# Patient Record
Sex: FEMALE | Race: BLACK OR AFRICAN AMERICAN | Employment: FULL TIME | URBAN - METROPOLITAN AREA
[De-identification: names, ages, dates, MRNs, and addresses within clinical notes are randomized per-mention and may not be internally consistent; named-entity substitution may affect disease eponyms.]

---

## 2017-04-11 ENCOUNTER — OFFICE VISIT (OUTPATIENT)
Dept: CARDIOLOGY CLINIC | Age: 57
End: 2017-04-11

## 2017-04-11 VITALS
WEIGHT: 174 LBS | DIASTOLIC BLOOD PRESSURE: 80 MMHG | BODY MASS INDEX: 30.83 KG/M2 | OXYGEN SATURATION: 98 % | SYSTOLIC BLOOD PRESSURE: 158 MMHG | HEART RATE: 120 BPM | HEIGHT: 63 IN

## 2017-04-11 DIAGNOSIS — R00.0 SINUS TACHYCARDIA: Primary | ICD-10-CM

## 2017-04-11 DIAGNOSIS — R00.2 PALPITATIONS: ICD-10-CM

## 2017-04-11 DIAGNOSIS — I10 ESSENTIAL HYPERTENSION: ICD-10-CM

## 2017-04-11 DIAGNOSIS — I45.10 RBBB: ICD-10-CM

## 2017-04-11 NOTE — PROGRESS NOTES
HISTORY OF PRESENT ILLNESS  Yanick Licona is a 60 y.o. female. HPI  She feels good. She denies palpitations at the present time, but she has had some palpitations as flip-flops. She denies prolonged palpitations. She denies chest pain, dyspnea, orthopnea or PND. She has had no dizziness or syncope. She has had no symptoms to indicate TIA or amaurosis fugax. Her blood pressure was initially 170/90, which came down to 158/80, and her pulse rate was up to 120 per minute, which came down to 105 per minute a few minutes later. She is a nonsmoker. She has history of hypertension but no diabetes mellitus. Her cholesterol profile is unknown, but she has been told that it has been okay. She denies a family history of coronary artery disease other than her maternal grandfather having a heart attack in his [de-identified]. Review of Systems   Constitutional: Negative for malaise/fatigue and weight loss. HENT: Negative for hearing loss. Eyes: Negative for blurred vision and double vision. Respiratory: Negative for shortness of breath. Cardiovascular: Positive for palpitations. Negative for chest pain, orthopnea, claudication, leg swelling and PND. Gastrointestinal: Negative for blood in stool, heartburn and melena. Genitourinary: Negative for dysuria, frequency, hematuria and urgency. Musculoskeletal: Negative for back pain and joint pain. Skin: Negative for itching and rash. Neurological: Negative for dizziness, loss of consciousness, weakness and headaches. Psychiatric/Behavioral: Negative for depression and memory loss. Physical Exam   Constitutional: She is oriented to person, place, and time. She appears well-developed and well-nourished. HENT:   Head: Normocephalic and atraumatic. Eyes: Conjunctivae are normal. Pupils are equal, round, and reactive to light. Neck: Normal range of motion. Neck supple. No JVD present.    Cardiovascular: Regular rhythm, S1 normal and S2 normal.   No extrasystoles are present. Tachycardia present. PMI is not displaced. Exam reveals no gallop and no friction rub. No murmur heard. Pulses:       Carotid pulses are 3+ on the right side, and 3+ on the left side. Pulmonary/Chest: Effort normal. She has no rales. Abdominal: Soft. There is no tenderness. Musculoskeletal: She exhibits no edema. Neurological: She is alert and oriented to person, place, and time. No cranial nerve deficit. Skin: Skin is warm and dry. Psychiatric: She has a normal mood and affect. Her behavior is normal.     Visit Vitals    /80    Pulse (!) 120    Ht 5' 2.5\" (1.588 m)    Wt 78.9 kg (174 lb)    SpO2 98%    BMI 31.32 kg/m2       Past Medical History:   Diagnosis Date    HTN (hypertension)        Social History     Social History    Marital status:      Spouse name: N/A    Number of children: N/A    Years of education: N/A     Occupational History    Not on file. Social History Main Topics    Smoking status: Never Smoker    Smokeless tobacco: Never Used    Alcohol use No    Drug use: Not on file    Sexual activity: Not on file     Other Topics Concern    Not on file     Social History Narrative       Family History   Problem Relation Age of Onset    Hypertension Mother        Past Surgical History:   Procedure Laterality Date    HX TONSILLECTOMY      HX TUBAL LIGATION         Current Outpatient Prescriptions   Medication Sig Dispense Refill    valsartan-hydrochlorothiazide (DIOVAN-HCT) 80-12.5 mg per tablet Take 1 Tab by mouth daily. 0       EKG: sinus tachycardia, RBBB, HR faster than 5/17/16  . ASSESSMENT and PLAN  Encounter Diagnoses   Name Primary?     Sinus tachycardia Yes    Palpitations     Essential hypertension     RBBB    This patient had sinus tachycardia at a rate of 120 when she first came in, which came down to 105  still fast.  This may well be related to anxiety since her blood pressure was elevated at first as well. Her tachycardic response appears to be somewhat excessive. At this point, I would proceed with the Holter monitor to see if she has any inappropriate sinus tachycardia. She may benefit from betablocker treatment for the purpose of treating her hypertension and tachycardia in the future.

## 2017-04-11 NOTE — MR AVS SNAPSHOT
Visit Information Date & Time Provider Department Dept. Phone Encounter #  
 4/11/2017  3:40 PM Bill Fatima MD [facility] [id number] Upcoming Health Maintenance Date Due Hepatitis C Screening 1960 DTaP/Tdap/Td series (1 - Tdap) 6/26/1979 PAP AKA CERVICAL CYTOLOGY 6/26/1979 BREAST CANCER SCRN MAMMOGRAM 6/26/2008 FOBT Q 1 YEAR AGE 50-75 6/26/2008 INFLUENZA AGE 9 TO ADULT 8/1/2016 Allergies as of 4/11/2017  Review Complete On: 4/11/2017 By: Bill Fatima MD  
 Not on File Current Immunizations  Never Reviewed No immunizations on file. Not reviewed this visit You Were Diagnosed With   
  
 Codes Comments Palpitations    -  Primary ICD-10-CM: R00.2 ICD-9-CM: 785.1 Essential hypertension     ICD-10-CM: I10 
ICD-9-CM: 401.9 RBBB     ICD-10-CM: I45.10 ICD-9-CM: 426.4 Vitals BP Pulse Height(growth percentile) Weight(growth percentile) SpO2 BMI  
 158/80 (!) 120 5' 2.5\" (1.588 m) 174 lb (78.9 kg) 98% 31.32 kg/m2 Smoking Status Never Smoker Vitals History BMI and BSA Data Body Mass Index Body Surface Area  
 31.32 kg/m 2 1.87 m 2 Your Updated Medication List  
  
   
This list is accurate as of: 4/11/17  4:47 PM.  Always use your most recent med list.  
  
  
  
  
 valsartan-hydroCHLOROthiazide 80-12.5 mg per tablet Commonly known as:  DIOVAN-HCT Take 1 Tab by mouth daily. We Performed the Following AMB POC EKG ROUTINE W/ 12 LEADS, INTER & REP [27587 CPT(R)] To-Do List   
 04/11/2017 ECG:  ECG HOLTER MONITOR, UP TO 48 HRS   
  
 04/17/2017 8:00 AM  
  Appointment with HBV HOLTER TECH at [facility] NON-INVASIVE CARD ([phone]) Age Limit for ALL Heart procedures @ [facility] 1 is 18 yrs and older only. Under the age of 25, refer to [employer] ([phone]).   PATIENTS REPORT TO: [facility]) - 2nd Floor [address]  This study requires a physician's written prescription. Patients may bring the order or it may also be faxed to Central Scheduling at [phone]. Please wear a shirt with buttons down the front. Bring list of medications. Do not have a bath/shower during your 24 hour recording. Please remind patient they will need to return 24 hours, 48 hours, or 72 hours after monitor is in place to have it removed by the technician.  (example: If patient is scheduled for a 24 hour holter, return 24 hours after monitor is in place to have it removed. If patient is scheduled for a 48 hour holter monitor, then they would return 48 hours after holter is in place, etc.)  Staff is NOT available on Saturdays to remove holter monitor. IMPORTANT:  Once patient has been fitted with a HOLTER MONITOR, they should not have any other exams performed until the Holter has been removed. Introducing [facility] SERVICES! Janusz Pennington introduces [employer] patient portal. Now you can access parts of your medical record, email your doctor's office, and request medication refills online. 1. In your internet browser, go to [email or web address] 2. Click on the First Time User? Click Here link in the Sign In box. You will see the New Member Sign Up page. 3. Enter your [employer] Access Code exactly as it appears below. You will not need to use this code after youve completed the sign-up process. If you do not sign up before the expiration date, you must request a new code. · [employer] Access Code: [email or web address] Expires: 7/10/2017  3:28 PM 
 
4. Enter the last four digits of your Social Security Number (xxxx) and Date of Birth (mm/dd/yyyy) as indicated and click Submit. You will be taken to the next sign-up page. 5. Create a [employer] ID. This will be your [employer] login ID and cannot be changed, so think of one that is secure and easy to remember. 6. Create a [employer] password. You can change your password at any time. 7. Enter your Password Reset Question and Answer. This can be used at a later time if you forget your password. 8. Enter your e-mail address. You will receive e-mail notification when new information is available in [address] 9. Click Sign Up. You can now view and download portions of your medical record. 10. Click the Download Summary menu link to download a portable copy of your medical information. If you have questions, please visit the Frequently Asked Questions section of the [employer] website. Remember, [employer] is NOT to be used for urgent needs. For medical emergencies, dial 911. Now available from your iPhone and Android! Please provide this summary of care documentation to your next provider. Your primary care clinician is listed as DESIREE RETANA. If you have any questions after today's visit, please call [phone].

## 2017-04-11 NOTE — PROGRESS NOTES
1. Have you been to the ER, urgent care clinic since your last visit? Hospitalized since your last visit? no  2. Have you seen or consulted any other health care providers outside of the [address] [place] [address] since your last visit? Include any pap smears or colon screening.  no

## 2017-04-17 ENCOUNTER — HOSPITAL ENCOUNTER (OUTPATIENT)
Dept: NON INVASIVE DIAGNOSTICS | Age: 57
Discharge: HOME | End: 2017-04-17
Attending: INTERNAL MEDICINE
Payer: COMMERCIAL

## 2017-04-17 DIAGNOSIS — R00.0 SINUS TACHYCARDIA: ICD-10-CM

## 2017-04-17 DIAGNOSIS — R00.2 PALPITATIONS: ICD-10-CM

## 2017-04-17 PROCEDURE — 93225 XTRNL ECG REC<48 HRS REC: CPT | Performed by: INTERNAL MEDICINE

## 2018-09-26 ENCOUNTER — TELEPHONE (OUTPATIENT)
Dept: CARDIOLOGY CLINIC | Age: 58
End: 2018-09-26

## 2018-09-26 NOTE — TELEPHONE ENCOUNTER
Pt is on Valsartan recall list. Pt was notified and PCP changed her medication to Losartan-HTZ 50-12.5mg daily.